# Patient Record
(demographics unavailable — no encounter records)

---

## 2020-12-22 NOTE — REP
INDICATION:

DATING AND VIABILITY



COMPARISON:

None.



TECHNIQUE:

Transabdominal and transvaginal 1st trimester obstetrical ultrasound with color

Doppler evaluation.



FINDINGS:

Uterus measures 9.7 x 5.6 x 5.8 cm with decidual reaction and presumed empty

gestational sac.  Mean sac diameter of 10 mm corresponds to 5 weeks 5 days gestational

age.  Bilateral maternal ovaries are normal in appearance.



IMPRESSION:

Findings suggest early pregnancy.  Differential diagnosis includes blighted ovum and

less likely pseudo gestational sac related to ectopic pregnancy.  Correlation with

serial HCG levels recommended and repeat ultrasound as necessary.





<Electronically signed by Lyle Baldwin > 12/22/20 [id number]

## 2021-01-14 NOTE — REP
INDICATION:

VIABILITY



COMPARISON:

None.



TECHNIQUE:

Transabdominal and transvaginal 1st trimester obstetrical ultrasound with color

Doppler evaluation.



FINDINGS:

Anteverted uterus measures 10.9 x 4.8 x 6.1 cm.  A gestational sac with fetal pole is

appreciated with a CRL of 5.9 mm corresponding to 6 weeks 3 days gestational age.

However, no fetal cardiac activity is identified and findings are suspicious for fetal

demise.  Maternal ovaries are normal.  No pelvic free fluid.



IMPRESSION:

Fetal pole measuring at 6 weeks 3 days gestational age without cardiac activity.

Findings concerning for fetal demise.  Correlation with serial HCG levels and repeat

ultrasound are recommended.





<Electronically signed by Lyle Baldwin > 01/14/21 0320